# Patient Record
Sex: FEMALE | Race: OTHER | RURAL
[De-identification: names, ages, dates, MRNs, and addresses within clinical notes are randomized per-mention and may not be internally consistent; named-entity substitution may affect disease eponyms.]

---

## 2017-02-03 NOTE — PATIENT DISCUSSION
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 Results and implications for treatment plan discussed with patient.

## 2017-02-03 NOTE — PATIENT DISCUSSION
MODERATE DRY EYES: PRESCRIBED DISAPPEARING PRESERVATIVE OR PRESERVATIVE FREE ARTIFICIAL TEARS BID &ndash; QID4-6X A DAY, OU AND THE DAILY INTAKE OF OMEGA-3 DHA/EPA FATTY ACIDS TO HELP RELIEVE SYMPTOMS. ADD NIGHTLY LUBRICATING OINTMENT OR GEL. WILL CONSIDER RESTASIS OR PUNCTAL PLUGS AND/OR LIPIFLOW TREATMENT NEXT VISIT IF NOT RESPONSIVE OR IF SYMPTOMS PERSIST. RETURN FOR FOLLOW-UP AS SCHEDULED OR SOONER IF SYMPTOMS WORSEN.

## 2017-02-03 NOTE — PATIENT DISCUSSION
AMD (DRY), OU: INTERVAL RESOLUTION OF INTRAFOVEAL CYST. AMSLER GRID, CALLBACK INSTRUCTIONS GIVEN. PRESCRIBE AREDS 2 VITAMINS / AMSLER GRID QD/ UV PROTECTION. SMOKING CESSATION EMPHASIZED. RETURN FOR FOLLOW-UP AS SCHEDULED.

## 2020-06-01 NOTE — PATIENT DISCUSSION
Eyelid Lesion Biopsy Counseling: The patient has presented with one or more eyelid or facial lesions with growth, change, irritation or abnormal appearance. The lesion was measured at the slit lamp and  a baseline photo was taken today. Will monitor. If any change in lesion, recommend biopsy.

## 2020-06-01 NOTE — PATIENT DISCUSSION
EPIDERMAL INCLUSION CYST. STARTING TO BE BOTHERSOME TO PATIENT. BASELINE PHOTO TAKEN TODAY. RECOMMEND BX IF SYMPTOMS PERSIST.

## 2021-02-08 NOTE — PATIENT DISCUSSION
K SICCA OU: NO IMPROVEMENT WITH PRESERVATIVE FREE ARTIFICIAL TEARS 4-6X A DAY, OU  ADD NIGHTLY LUBRICATING OINTMENT OR GEL. PRESCRIBED LOTEMAX SM BID X2 WEEKS. CONSIDER PUNCTAL PLUGS AND/OR LIPIFLOW TREATMENT NEXT VISIT IF NOT RESPONSIVE OR IF SYMPTOMS PERSIST. ADD LL PLUGS OU TODAY,  RETURN FOR FOLLOW-UP AS SCHEDULED OR SOONER IF SYMPTOMS WORSEN.

## 2021-08-17 NOTE — PATIENT DISCUSSION
K SICCA OU: PT STOPPED RESTASIS. CONTINUE PRESERVATIVE FREE ARTIFICIAL TEARS 4-6X A DAY, OU ADD NIGHTLY LUBRICATING OINTMENT OR GEL.

## 2022-08-16 NOTE — PATIENT DISCUSSION
[facility] monitoring, AREDS2 vitamins, no smoking, green leafy vegetables discussed.

## 2022-09-23 ENCOUNTER — NEW PATIENT (OUTPATIENT)
Dept: RURAL CLINIC 1 | Facility: CLINIC | Age: 72
End: 2022-09-23

## 2022-09-23 DIAGNOSIS — H25.13: ICD-10-CM

## 2022-09-23 DIAGNOSIS — H52.4: ICD-10-CM

## 2022-09-23 PROCEDURE — 92015 DETERMINE REFRACTIVE STATE: CPT

## 2022-09-23 PROCEDURE — 92004 COMPRE OPH EXAM NEW PT 1/>: CPT

## 2022-09-23 ASSESSMENT — TONOMETRY
OD_IOP_MMHG: 17
OS_IOP_MMHG: 17

## 2022-09-23 ASSESSMENT — VISUAL ACUITY
OD_SC: 20/100
OS_SC: 20/40